# Patient Record
Sex: FEMALE | Race: WHITE | NOT HISPANIC OR LATINO
[De-identification: names, ages, dates, MRNs, and addresses within clinical notes are randomized per-mention and may not be internally consistent; named-entity substitution may affect disease eponyms.]

---

## 2023-04-14 PROBLEM — Z00.00 ENCOUNTER FOR PREVENTIVE HEALTH EXAMINATION: Status: ACTIVE | Noted: 2023-04-14

## 2023-04-17 ENCOUNTER — APPOINTMENT (OUTPATIENT)
Dept: ORTHOPEDIC SURGERY | Facility: CLINIC | Age: 53
End: 2023-04-17

## 2023-05-14 NOTE — PHYSICAL EXAM
[de-identified] : General: Well-nourished, well-developed, alert, and in no acute distress.\par Head: Normocephalic.\par Eyes: Pupils equal, extraocular muscles intact, normal sclera.\par Nose: No nasal discharge.\par Cardiovascular: Extremities are warm and well perfused. Distal pulses are symmetric bilaterally.\par Respiratory: No labored breathing.\par Extremities: Sensation is intact distally bilaterally. Distal pulses are symmetric bilaterally\par Lymphatic: No regional lymphadenopathy, no lymphedema\par Neurologic: No focal deficits\par Skin: Normal skin color, texture, and turgor\par Psychiatric: Normal affect \par \par MSK:\par Examination of   knee:\par \par Gait normal antalgic\par Genu   alignment\par Pain with double leg squat\par Valgus moment with single leg squat \par Moderate effusion\par No erythema, hematoma or skin lesion\par Tender to palpation:\par Nontender to palpation: medial joint line, lateral joint line, medial patellar facet, lateral patellar facet, quad tendon, patellar tendon, pes, Gerdy's tubercle, tibial tuberosity, popliteal fossa, hamstrings, ITB \par No warmth\par No Baker's cyst palpable\par ROM: 0-120\par Mild patellar crepitus\par \par Log roll negative\par Lachman negative\par Anterior drawer negative\par Posterior drawer negative\par Varus/valgus stress negative at 0 and 30 deg\par Javon  negative\par Patellar grind negative\par Noble negative\par Ge negative\par Thessaly negative\par \par \par Examination of  knee:\par \par No effusion, erythema, hematoma or skin lesion\par Nontender to palpation: medial joint line, lateral joint line, medial patellar facet, lateral patellar facet, quad tendon, patellar tendon, pes, Gerdy's tubercle, tibial tuberosity, popliteal fossa, hamstrings, ITB \par No warmth\par No Baker's cyst palpable\par ROM: 0-120\par No patellar crepitus\par \par Log roll negative\par Lachman negative\par Anterior drawer negative\par Posterior drawer negative\par Varus/valgus stress negative at 0 and 30 deg\par Javon  negative\par Patellar grind negative \par Noble negative\par Ge negative\par Thessaly negative\par \par Sensation is intact to light touch over the superficial and deep peroneal nerve distributions and the posterior tibial nerve distribution. Capillary refill is less than two seconds. Posterior tibial and dorsalis pedis pulses 2+ equal bilaterally. No calf swelling or tenderness bilaterally. Strength testing shows  Hip flexion 5/5, Hip abduction 5/5, Hip abduction 5/5, Knee Extension 5/5, Knee Flexion 5/5, dorsiflexion 5/5, plantar flexion 5/5, EHL 5/5\par Reflexes: Patellar 2+, Achilles 2+\par

## 2023-05-14 NOTE — HISTORY OF PRESENT ILLNESS
[de-identified] : GILLES CARTER is a 53 year old female who presents with  knee pain.\par States the onset of pain was \par No antecedent trauma or injury. Has not experienced previously.\par Pain is described   in quality.\par  /10 in intensity \par Pain is nonradiating.\par There is associated \par There is no associated swelling, stiffness, numbness, paraesthesia or weakness.\par Exacerbating factors are standing, walking for prolonged periods, climbing stairs, descending stairs, rising from seated position.\par Has tried acetaminophen, NSAIDS, ice, heat, rest with   effect\par Patient ambulates independently.\par Exercises regularly. \par Has not tried PT\par Employment:\par Lives

## 2023-05-14 NOTE — ASSESSMENT
[FreeTextEntry1] : GILLES CARTER is a 53 year old female with    knee pain.\par I discussed with the patient that their symptoms, signs, and imaging are most consistent with ***. \par We reviewed the natural history of this condition and treatment options ranging from conservative measures (activity modification, physical therapy, icing, oral anti-inflammatory and/or analgesic medications, steroid injection, HA gel injections, PRP injections) to surgical management. \par We agreed on the following plan:\par \par \par Activity modification: low impact aerobic activity (stationary bike, elliptical, swimming)\par Recommend 150 min per week of moderate intensity aerobic activity \par Start Home Exercises for knee conditioning. Demonstration, resistance bands and handout provided. \par Physical therapy. Referral provided.\par Medication:    prescription provided.\par Advanced imaging: consider MRI if no symptomatic improvement \par Follow up in 6-8 weeks.

## 2023-05-15 ENCOUNTER — APPOINTMENT (OUTPATIENT)
Dept: ORTHOPEDIC SURGERY | Facility: CLINIC | Age: 53
End: 2023-05-15

## 2023-08-22 ENCOUNTER — EMERGENCY (EMERGENCY)
Facility: HOSPITAL | Age: 53
LOS: 1 days | Discharge: LEFT BEFORE DISCHARGE | End: 2023-08-22
Admitting: EMERGENCY MEDICINE
Payer: MEDICAID

## 2023-08-22 VITALS
SYSTOLIC BLOOD PRESSURE: 118 MMHG | RESPIRATION RATE: 18 BRPM | OXYGEN SATURATION: 98 % | DIASTOLIC BLOOD PRESSURE: 65 MMHG | TEMPERATURE: 98 F | HEART RATE: 75 BPM

## 2023-08-22 VITALS
TEMPERATURE: 98 F | WEIGHT: 132.94 LBS | SYSTOLIC BLOOD PRESSURE: 119 MMHG | HEART RATE: 97 BPM | HEIGHT: 65 IN | OXYGEN SATURATION: 97 % | DIASTOLIC BLOOD PRESSURE: 68 MMHG | RESPIRATION RATE: 18 BRPM

## 2023-08-22 DIAGNOSIS — R07.0 PAIN IN THROAT: ICD-10-CM

## 2023-08-22 DIAGNOSIS — R53.83 OTHER FATIGUE: ICD-10-CM

## 2023-08-22 DIAGNOSIS — T58.14XA: ICD-10-CM

## 2023-08-22 DIAGNOSIS — Z53.29 PROCEDURE AND TREATMENT NOT CARRIED OUT BECAUSE OF PATIENT'S DECISION FOR OTHER REASONS: ICD-10-CM

## 2023-08-22 PROCEDURE — 99282 EMERGENCY DEPT VISIT SF MDM: CPT

## 2023-08-22 PROCEDURE — 99283 EMERGENCY DEPT VISIT LOW MDM: CPT

## 2023-08-22 NOTE — ED ADULT NURSE NOTE - NSFALLUNIVINTERV_ED_ALL_ED
Bed/Stretcher in lowest position, wheels locked, appropriate side rails in place/Call bell, personal items and telephone in reach/Instruct patient to call for assistance before getting out of bed/chair/stretcher/Non-slip footwear applied when patient is off stretcher/[place] to call system/Physically safe environment - no spills, clutter or unnecessary equipment/Purposeful proactive rounding/Room/bathroom lighting operational, light cord in reach

## 2023-08-22 NOTE — ED PROVIDER NOTE - OBJECTIVE STATEMENT
53 f without significant hx states she called [facility] due to expensive bill; she wanted her home checked to see if the meter was overcharging her, and she was told there is a gas leak with an elevated CO level (states 300 ppm).  The gas was disconnected, she opened the windows.  She reports chronic mild fatigue and throat irritation for over a year.  No headache, no vomiting.  States sometimes sleeps 12 hours and doesn't know why.    PMHx denies   Meds denies

## 2023-08-22 NOTE — ED ADULT TRIAGE NOTE - CHIEF COMPLAINT QUOTE
Pt states "con darryn told me I have a gas leak. I don't know how long it has been. I think it has been years." Pt endorses some fatigue.

## 2023-08-22 NOTE — ED PROVIDER NOTE - CLINICAL SUMMARY MEDICAL DECISION MAKING FREE TEXT BOX
Pt presented due to concern for elevated CO levels in her apartment.  Has no s/s of acute CO poisoning. She does not want blood work, but is requesting oxygen therapy, stating "even if it is for just a placebo effect, I think it would help me".  Her gas was disconnected after the leak was detected, and she aired out her apartment.

## 2023-08-22 NOTE — ED PROVIDER NOTE - NSFOLLOWUPINSTRUCTIONS_ED_ALL_ED_FT
Please follow up with your primary care provider.  Return to the Emergency Department if you have any new or worsening symptoms, or if you have any concerns.  ===============  Carbon Monoxide Poisoning    WHAT YOU NEED TO KNOW:    What is carbon monoxide (CO) poisoning? CO poisoning is a life-threatening condition caused by exposure to high levels of CO. CO is a poisonous gas that you cannot see, taste, or smell. Exposure happens when you breathe in CO. CO can build up in your body and replace oxygen in your blood. Your brain, organs, and tissues can be damaged from a lack of oxygen. CO poisoning can be mild or severe. Severe poisoning can cause permanent injury.    Where is CO found?    Smoke from a fire    Faulty devices or equipment, such as a furnace, water heater, gas stove, or wood-burning stove or fireplace    Gas-powered tools, vehicles, or machines used in poorly ventilated areas, such as a barbecue grill or chain saw    Nonvented devices such as propane heaters, stoves, grills, or lanterns used inside a house, trailer, or tent    Exhaust from cars or other vehicles  What increases my risk for CO poisoning?    Older age    Heart disease, blood vessel disease, sickle cell anemia, and lung problems    Pregnancy    Smoking cigarettes    Work that uses equipment or chemicals that produce CO  What are the signs and symptoms of CO poisoning? Signs and symptoms may develop right after CO exposure, or several weeks later. You may have any of the following:    Blurred vision, dizziness, or a headache    Nausea, vomiting, or loss of appetite    Faster breathing than normal, or trouble breathing    Weakness, muscle pain, or dark urine    Chest pain, or a fast, strong, or irregular heartbeat    Confusion, fainting, or seizures    Tremors or shaking, or trouble moving, bending arms or legs, or walking    Difficulty speaking, chewing, or controlling facial muscles  How is CO poisoning diagnosed? Your healthcare provider will examine you and ask about your symptoms. Tell him or her if anyone in your home has similar signs and symptoms. Pets may also show similar signs. Tell him or her if you use home heating devices that burn gas, oil, wood, or other fuel. You may need blood tests to check the level of CO in your blood. Blood tests may also show problems caused by CO poisoning. Your breath may be tested for the amount of CO it contains. Your heart rhythm and brain function may also be monitored.    How is CO poisoning treated?    Extra oxygen may be given if your blood oxygen level is lower than it should be. You may get oxygen through a mask placed over your nose and mouth or through small tubes placed in your nostrils.    Hyperbaric oxygen therapy is used to get more oxygen into your body. The oxygen is given under pressure to help it get into your tissues and blood. You will be in a room called a hyperbaric chamber during the treatment.  What should I do if I think I or someone else was exposed to CO? CO poisoning can seem like the flu. Anyone who may have been exposed to CO needs to be checked by a healthcare provider. The following are steps to take if you believe you or someone else is near a source of CO:    Move into fresh air. If safely possible, shut off the source of the CO. Wait for a professional to help you if you cannot do this safely.    Call 911. Explain when the exposure happened and how long you think it lasted.    Start CPR if needed and you are trained on how to do this. CPR may be needed if the person is not breathing.  What can I do to prevent CO poisoning?    Install a CO detector in every sleeping area in your home. Place it 5 feet above the floor and away from fireplaces or gas-burning equipment. Change the batteries twice each year.    Check your chimney, furnace, or wood stoves. Check for problems every year before you use them. Have your fireplace flue cleaned on a regular basis.    Be careful with gas appliances. Do not use barbecues or heaters that burn fuel inside your home or other closed spaces. Do not use your gas kitchen oven to heat your home. Make sure appliances are properly hooded or vented.    Do not let motor vehicles run in closed areas. This includes letting your car run in a garage. If the car is outside, check that the exhaust pipe is not blocked.    Do not smoke. Cigarette smoke contains small amounts of CO. This increases your risk of CO poisoning if you are exposed to a source of CO. Ask your healthcare provider for information if you need help quitting.  Call your local emergency number (911 in the ) if:    You have chest pain or an irregular or fast heartbeat.    You or someone close to you has a seizure or is unconscious.    You have trouble breathing or are breathing faster than usual.    You feel like you are going to faint.    You feel weak, have trouble moving, or have severe muscle pain.    Your urine becomes dark or red.  When should I call my doctor?    You feel dizzy.    You have a headache or start to vomit.    Your eyesight becomes blurred.    You have questions or concerns about your condition or care.  CARE AGREEMENT:    You have the right to help plan your care. Learn about your health condition and how it may be treated. Discuss treatment options with your healthcare providers to decide what care you want to receive. You always have the right to refuse treatment.

## 2023-08-22 NOTE — ED ADULT NURSE NOTE - OBJECTIVE STATEMENT
Pt presents to ED c/o Pt presents to ED c/o "exposure to a gas leak for over a year". Pt reports she was alerted by [facility] that she was exposed to a gas leak in her apartment, is unsure when it started. Denies SOB, cough, CP, dizziness, n/v/d, fevers, chills, abd pain. VSS. A&ox4.

## 2023-08-22 NOTE — ED PROVIDER NOTE - PHYSICAL EXAMINATION
CONSTITUTIONAL: NAD   SKIN: Normal color and turgor.    HEAD: NC/AT.  EYES: Conjunctiva clear. EOMI. PERRL.    ENT: Airway clear. Normal voice. MMM, normal color.  RESPIRATORY:  Normal work of breathing. Lungs CTAB.  CARDIOVASCULAR:  RRR, S1S2. No M/R/G.      GI:  Abdomen soft, nontender.    MSK: Neck supple.  No LE edema or calf tenderness. No joint swelling or ROM limitation.  NEURO: Alert; CN: grossly intact. Speech clear.  BENITEZ. Gait steady.

## 2023-12-08 ENCOUNTER — OUTPATIENT (OUTPATIENT)
Dept: OUTPATIENT SERVICES | Facility: HOSPITAL | Age: 53
LOS: 1 days | End: 2023-12-08
Payer: MEDICAID

## 2023-12-08 ENCOUNTER — APPOINTMENT (OUTPATIENT)
Dept: ORTHOPEDIC SURGERY | Facility: CLINIC | Age: 53
End: 2023-12-08
Payer: MEDICAID

## 2023-12-08 ENCOUNTER — RESULT REVIEW (OUTPATIENT)
Age: 53
End: 2023-12-08

## 2023-12-08 VITALS
WEIGHT: 132 LBS | SYSTOLIC BLOOD PRESSURE: 111 MMHG | BODY MASS INDEX: 21.99 KG/M2 | HEART RATE: 72 BPM | HEIGHT: 65 IN | OXYGEN SATURATION: 97 % | DIASTOLIC BLOOD PRESSURE: 73 MMHG | TEMPERATURE: 97.9 F

## 2023-12-08 DIAGNOSIS — Z78.9 OTHER SPECIFIED HEALTH STATUS: ICD-10-CM

## 2023-12-08 DIAGNOSIS — Z87.39 PERSONAL HISTORY OF OTHER DISEASES OF THE MUSCULOSKELETAL SYSTEM AND CONNECTIVE TISSUE: ICD-10-CM

## 2023-12-08 DIAGNOSIS — Z60.2 PROBLEMS RELATED TO LIVING ALONE: ICD-10-CM

## 2023-12-08 DIAGNOSIS — Z56.0 UNEMPLOYMENT, UNSPECIFIED: ICD-10-CM

## 2023-12-08 PROCEDURE — 73564 X-RAY EXAM KNEE 4 OR MORE: CPT

## 2023-12-08 PROCEDURE — 73564 X-RAY EXAM KNEE 4 OR MORE: CPT | Mod: 50

## 2023-12-08 PROCEDURE — 73564 X-RAY EXAM KNEE 4 OR MORE: CPT | Mod: 26,50

## 2023-12-08 PROCEDURE — 99203 OFFICE O/P NEW LOW 30 MIN: CPT

## 2023-12-08 RX ORDER — SERTRALINE HYDROCHLORIDE 50 MG/1
50 TABLET, FILM COATED ORAL
Refills: 0 | Status: ACTIVE | COMMUNITY

## 2023-12-08 RX ORDER — BUPROPION HYDROCHLORIDE 100 MG/1
TABLET, FILM COATED ORAL
Refills: 0 | Status: ACTIVE | COMMUNITY

## 2023-12-08 SDOH — ECONOMIC STABILITY - INCOME SECURITY: UNEMPLOYMENT, UNSPECIFIED: Z56.0

## 2023-12-08 SDOH — SOCIAL STABILITY - SOCIAL INSECURITY: PROBLEMS RELATED TO LIVING ALONE: Z60.2

## 2023-12-12 RX ORDER — HYALURONATE SODIUM 20 MG/2 ML
20 SYRINGE (ML) INTRAARTICULAR
Qty: 2 | Refills: 0 | Status: ACTIVE | COMMUNITY
Start: 2023-12-12

## 2024-02-08 ENCOUNTER — APPOINTMENT (OUTPATIENT)
Dept: ORTHOPEDIC SURGERY | Facility: CLINIC | Age: 54
End: 2024-02-08
Payer: MEDICAID

## 2024-02-08 VITALS
DIASTOLIC BLOOD PRESSURE: 55 MMHG | WEIGHT: 132 LBS | HEART RATE: 72 BPM | HEIGHT: 65 IN | OXYGEN SATURATION: 85 % | BODY MASS INDEX: 21.99 KG/M2 | SYSTOLIC BLOOD PRESSURE: 115 MMHG

## 2024-02-08 PROCEDURE — 20611 DRAIN/INJ JOINT/BURSA W/US: CPT | Mod: RT

## 2024-02-10 NOTE — PROCEDURE
[de-identified] : GILLES CARTER is a 54 year old female with bilateral knee OA who presents for viscosupplementation injection of bilateral knees. #1/3  No recent infection, fever or chills.  Ultrasound-guided intra-articular viscosupplementation injection of right knee:   Following a discussion of the risks (bleeding, infection) and benefits, verbal consent was obtained. Patient placed in supine position. The suprapatellar recess and surrounding structures (patella, femur, quadriceps tendon, suprapatellar fat pad and prefemoral fat pad) were visualized in SAX and LAX with Sonosite 15 Hz linear transducer.   Superolateral knee was anaesthetised with ethyl chloride spray. Under strict sterile technique the right knee was prepped with chlorhexadine. Using ultrasound guidance (superolateral approach) a 20 G 1.5 inch needle was inserted into the suprapatellar recess and 2 mL of Euflexxa HA gel was injected intra-articularly without resistance.   The use of direct ultrasound visualization was necessary to increase patient safety by identifying and avoiding inadvertent needle placement within the neurovascular and osteochondral structures. Additionally, the increased accuracy of needle placement may improve therapeutic efficacy and allow higher diagnostic specificity when evaluating the effectiveness of this injection.    Ultrasound-guided intra-articular viscosupplementation injection of left knee:    The suprapatellar recess and surrounding structures (patella, femur, quadriceps tendon, suprapatellar fat pad and prefemoral fat pad) were visualized in SAX and LAX with Sonosite 15 Hz linear transducer.   Superolateral knee was anaesthetised with ethyl chloride spray. Under strict sterile technique the right knee was prepped with chlorhexadine. Using ultrasound guidance (superolateral approach) a 20 G 1.5 inch needle was inserted into the suprapatellar recess and 2 mL of Euflexxa HA gel was injected intra-articularly without resistance.   The use of direct ultrasound visualization was necessary to increase patient safety by identifying and avoiding inadvertent needle placement within the neurovascular and osteochondral structures. Additionally, the increased accuracy of needle placement may improve therapeutic efficacy and allow higher diagnostic specificity when evaluating the effectiveness of this injection.   The patient tolerated the procedures well. Post-injection instructions given (no strenuous activity for 48 hours, ice, elevate). Patient verbalized understanding. Follow up in 1 week for #2/3 HA gel injection.  Euflexxa - B/L knee Lot- [id number] exp- 2024-12-09 [facility]- [address]-4.

## 2024-02-10 NOTE — HISTORY OF PRESENT ILLNESS
[de-identified] : GILLES CARTER is a 54 year old female presents to follow up with bilateral knee pain. Last visit was 12/08/23 at which time patient was advised to start home exercises and PT. States pain has

## 2024-02-10 NOTE — PROCEDURE
[de-identified] : GILLES CARTER is a 54 year old female with bilateral knee OA who presents for viscosupplementation injection of bilateral knees. #1/3  No recent infection, fever or chills.  Ultrasound-guided intra-articular viscosupplementation injection of right knee:   Following a discussion of the risks (bleeding, infection) and benefits, verbal consent was obtained. Patient placed in supine position. The suprapatellar recess and surrounding structures (patella, femur, quadriceps tendon, suprapatellar fat pad and prefemoral fat pad) were visualized in SAX and LAX with Sonosite 15 Hz linear transducer.   Superolateral knee was anaesthetised with ethyl chloride spray. Under strict sterile technique the right knee was prepped with chlorhexadine. Using ultrasound guidance (superolateral approach) a 20 G 1.5 inch needle was inserted into the suprapatellar recess and 2 mL of Euflexxa HA gel was injected intra-articularly without resistance.   The use of direct ultrasound visualization was necessary to increase patient safety by identifying and avoiding inadvertent needle placement within the neurovascular and osteochondral structures. Additionally, the increased accuracy of needle placement may improve therapeutic efficacy and allow higher diagnostic specificity when evaluating the effectiveness of this injection.    Ultrasound-guided intra-articular viscosupplementation injection of left knee:    The suprapatellar recess and surrounding structures (patella, femur, quadriceps tendon, suprapatellar fat pad and prefemoral fat pad) were visualized in SAX and LAX with Sonosite 15 Hz linear transducer.   Superolateral knee was anaesthetised with ethyl chloride spray. Under strict sterile technique the right knee was prepped with chlorhexadine. Using ultrasound guidance (superolateral approach) a 20 G 1.5 inch needle was inserted into the suprapatellar recess and 2 mL of Euflexxa HA gel was injected intra-articularly without resistance.   The use of direct ultrasound visualization was necessary to increase patient safety by identifying and avoiding inadvertent needle placement within the neurovascular and osteochondral structures. Additionally, the increased accuracy of needle placement may improve therapeutic efficacy and allow higher diagnostic specificity when evaluating the effectiveness of this injection.   The patient tolerated the procedures well. Post-injection instructions given (no strenuous activity for 48 hours, ice, elevate). Patient verbalized understanding. Follow up in 1 week for #2/3 HA gel injection.  Euflexxa - B/L knee Lot- [id number] exp- 2024-12-09 [facility]- [id number].

## 2024-02-10 NOTE — ASSESSMENT
[FreeTextEntry1] : GILLES CARTER is a 54 year old female with bilateral knee pain. I discussed with the patient that their symptoms, signs, and imaging are most consistent with  **.  We reviewed the natural history of this condition and treatment options. We agreed on the following plan:  Encouraged to continue home exercises per handout. Continue physical therapy. Recommend 150 min per week of moderate intensity aerobic activity  Medication:    prescription provided. Imaging: Follow up in 6-8 weeks.

## 2024-02-10 NOTE — HISTORY OF PRESENT ILLNESS
[de-identified] : GILLES CARTER is a 54 year old female presents to follow up with bilateral knee pain. Last visit was 12/08/23 at which time patient was advised to start home exercises and PT. States pain has

## 2024-02-15 ENCOUNTER — APPOINTMENT (OUTPATIENT)
Dept: ORTHOPEDIC SURGERY | Facility: CLINIC | Age: 54
End: 2024-02-15
Payer: MEDICAID

## 2024-02-15 VITALS
OXYGEN SATURATION: 99 % | DIASTOLIC BLOOD PRESSURE: 63 MMHG | BODY MASS INDEX: 21.99 KG/M2 | WEIGHT: 132 LBS | HEART RATE: 80 BPM | HEIGHT: 65 IN | SYSTOLIC BLOOD PRESSURE: 100 MMHG

## 2024-02-15 PROCEDURE — 20611 DRAIN/INJ JOINT/BURSA W/US: CPT | Mod: 50

## 2024-02-17 NOTE — PROCEDURE
[de-identified] : GILLES CARTER is a 54 year old female with bilateral knee OA who presents for viscosupplementation injection of bilateral knees. #2/3 No recent infection, fever or chills.  Ultrasound-guided intra-articular viscosupplementation injection of right knee:  Following a discussion of the risks (bleeding, infection) and benefits, verbal consent was obtained. Patient placed in supine position. The suprapatellar recess and surrounding structures (patella, femur, quadriceps tendon, suprapatellar fat pad and prefemoral fat pad) were visualized in SAX and LAX with Sonosite 15 Hz linear transducer.  Superolateral knee was anaesthetised with ethyl chloride spray. Under strict sterile technique the right knee was prepped with chlorhexadine. Using ultrasound guidance (superolateral approach) a 20 G 1.5 inch needle was inserted into the suprapatellar recess and 2 mL of Euflexxa HA gel was injected intra-articularly without resistance.  The use of direct ultrasound visualization was necessary to increase patient safety by identifying and avoiding inadvertent needle placement within the neurovascular and osteochondral structures. Additionally, the increased accuracy of needle placement may improve therapeutic efficacy and allow higher diagnostic specificity when evaluating the effectiveness of this injection.   Ultrasound-guided intra-articular viscosupplementation injection of left knee:   The suprapatellar recess and surrounding structures (patella, femur, quadriceps tendon, suprapatellar fat pad and prefemoral fat pad) were visualized in SAX and LAX with Sonosite 15 Hz linear transducer.  Superolateral knee was anaesthetised with ethyl chloride spray. Under strict sterile technique the right knee was prepped with chlorhexadine. Using ultrasound guidance (superolateral approach) a 20 G 1.5 inch needle was inserted into the suprapatellar recess and 2 mL of Euflexxa HA gel was injected intra-articularly without resistance.  The use of direct ultrasound visualization was necessary to increase patient safety by identifying and avoiding inadvertent needle placement within the neurovascular and osteochondral structures. Additionally, the increased accuracy of needle placement may improve therapeutic efficacy and allow higher diagnostic specificity when evaluating the effectiveness of this injection.  The patient tolerated the procedures well. Post-injection instructions given (no strenuous activity for 48 hours, ice, elevate). Patient verbalized understanding. Follow up in 1 week for #3/3 HA gel injection.  EUFFLEXA BILATERAL KNEE JOINT  LOT:   [id number] EXP:  2024-12-09 MAN: [employer] NDC: [address]-7

## 2024-02-20 ENCOUNTER — APPOINTMENT (OUTPATIENT)
Dept: ORTHOPEDIC SURGERY | Facility: CLINIC | Age: 54
End: 2024-02-20
Payer: MEDICAID

## 2024-02-20 PROCEDURE — 20611 DRAIN/INJ JOINT/BURSA W/US: CPT | Mod: 50

## 2024-02-20 NOTE — PROCEDURE
[de-identified] : GILLES CARTER is a 54 year old female with bilateral knee OA who presents for viscosupplementation injection of bilateral knees. #3/3 No recent infection, fever or chills.  Ultrasound-guided intra-articular viscosupplementation injection of right knee:  Following a discussion of the risks (bleeding, infection) and benefits, verbal consent was obtained. Patient placed in supine position. The suprapatellar recess and surrounding structures (patella, femur, quadriceps tendon, suprapatellar fat pad and prefemoral fat pad) were visualized in SAX and LAX with Sonosite 15 Hz linear transducer.  Superolateral knee was anaesthetised with ethyl chloride spray. Under strict sterile technique the right knee was prepped with chlorhexadine. Using ultrasound guidance (superolateral approach) a 20 G 1.5 inch needle was inserted into the suprapatellar recess and 2 mL of Euflexxa HA gel was injected intra-articularly without resistance.  The use of direct ultrasound visualization was necessary to increase patient safety by identifying and avoiding inadvertent needle placement within the neurovascular and osteochondral structures. Additionally, the increased accuracy of needle placement may improve therapeutic efficacy and allow higher diagnostic specificity when evaluating the effectiveness of this injection.   Ultrasound-guided intra-articular viscosupplementation injection of left knee:   The suprapatellar recess and surrounding structures (patella, femur, quadriceps tendon, suprapatellar fat pad and prefemoral fat pad) were visualized in SAX and LAX with Sonosite 15 Hz linear transducer.  Superolateral knee was anaesthetised with ethyl chloride spray. Under strict sterile technique the right knee was prepped with chlorhexadine. Using ultrasound guidance (superolateral approach) a 20 G 1.5 inch needle was inserted into the suprapatellar recess and 2 mL of Euflexxa HA gel was injected intra-articularly without resistance.  The use of direct ultrasound visualization was necessary to increase patient safety by identifying and avoiding inadvertent needle placement within the neurovascular and osteochondral structures. Additionally, the increased accuracy of needle placement may improve therapeutic efficacy and allow higher diagnostic specificity when evaluating the effectiveness of this injection.  The patient tolerated the procedures well. Post-injection instructions given (no strenuous activity for 48 hours, ice, elevate). Patient verbalized understanding. Continue home exercises. Follow up in 4 months.  EUFLEXXA BILATERAL KNEE JOINT  LOT:   [id number] EXP:  2024-05-21 MAN: [email or web address]  NDC: [address]-2

## 2024-06-20 ENCOUNTER — APPOINTMENT (OUTPATIENT)
Dept: ORTHOPEDIC SURGERY | Facility: CLINIC | Age: 54
End: 2024-06-20
Payer: MEDICAID

## 2024-06-20 VITALS
SYSTOLIC BLOOD PRESSURE: 93 MMHG | OXYGEN SATURATION: 96 % | BODY MASS INDEX: 21.99 KG/M2 | WEIGHT: 132 LBS | HEIGHT: 65 IN | DIASTOLIC BLOOD PRESSURE: 58 MMHG | HEART RATE: 84 BPM

## 2024-06-20 DIAGNOSIS — M17.0 BILATERAL PRIMARY OSTEOARTHRITIS OF KNEE: ICD-10-CM

## 2024-06-20 PROCEDURE — 99213 OFFICE O/P EST LOW 20 MIN: CPT

## 2024-06-20 RX ORDER — CYCLOBENZAPRINE HYDROCHLORIDE 10 MG/1
10 TABLET, FILM COATED ORAL 3 TIMES DAILY
Qty: 30 | Refills: 2 | Status: ACTIVE | COMMUNITY
Start: 2024-06-20 | End: 1900-01-01

## 2024-06-20 RX ORDER — HYALURONATE SODIUM 20 MG/2 ML
20 SYRINGE (ML) INTRAARTICULAR
Qty: 2 | Refills: 0 | Status: ACTIVE | COMMUNITY
Start: 2024-06-20

## 2024-06-23 PROBLEM — M17.0 PRIMARY OSTEOARTHRITIS OF BOTH KNEES: Status: ACTIVE | Noted: 2023-12-10

## 2024-06-23 NOTE — ASSESSMENT
[FreeTextEntry1] : GILLES CARTER is a 54 year old female with bilateral knee pain.   I discussed with the patient that their symptoms, signs, and imaging are most consistent with osteoarthritis. We reviewed the natural history of this condition and treatment options. We agreed on the following plan:  Encouraged to continue home exercises per handout. Medication: Cyclobenzaprine 10mg HS prn  prescription provided. Will place order for HA gel injection for on or after 8/8/24 Patient will be notified once authorized to schedule appointment.

## 2024-06-23 NOTE — HISTORY OF PRESENT ILLNESS
[de-identified] : GILLES CARTER is a 54 year old female presents to follow up bilateral knee pain. Last visit was 02/05/4 at which time patient had receive a viscosupplementation injections.  Has been pain-free following injections. She had been taking Cyclobenzaprine occasionally at bedtime for knee pain. Her current prescription is 10 years old. Would like repeat prescription today.

## 2024-06-23 NOTE — PHYSICAL EXAM
[de-identified] : General: Well-nourished, well-developed, alert, and in no acute distress. Head: Normocephalic. Eyes: Pupils equal, extraocular muscles intact, normal sclera. Nose: No nasal discharge. Cardiovascular: Extremities are warm and well perfused. Distal pulses are symmetric bilaterally. Respiratory: No labored breathing. Extremities: Sensation is intact distally bilaterally. Distal pulses are symmetric bilaterally Lymphatic: No regional lymphadenopathy, no lymphedema Neurologic: No focal deficits Skin: Normal skin color, texture, and turgor Psychiatric: Normal affect  MSK: Examination of [right] knee:   [No] effusion No erythema, hematoma or skin lesion Nontender to palpation: medial joint line, lateral joint line, medial patellar facet, lateral patellar facet, quad tendon, patellar tendon, pes, Gerdy's tubercle, tibial tuberosity, popliteal fossa, hamstrings, ITB No warmth No Baker's cyst palpable ROM: 0-130 Mild patellar crepitus   Log roll negative Lachman negative Anterior drawer negative Posterior drawer negative Varus/valgus stress negative at 0 and 30 deg Javon negative Patellar grind negative   Examination of [left] knee:   No effusion, erythema, hematoma or skin lesion Nontender to palpation: medial joint line, lateral joint line, medial patellar facet, lateral patellar facet, quad tendon, patellar tendon, pes, Gerdy's tubercle, tibial tuberosity, popliteal fossa, hamstrings, ITB No warmth No Baker's cyst palpable ROM: 0-130 Mild patellar crepitus   Log roll negative Lachman negative Anterior drawer negative Posterior drawer negative Varus/valgus stress negative at 0 and 30 deg Javon negative Patellar grind negative   Sensation is intact to light touch over the superficial and deep peroneal nerve distributions and the posterior tibial nerve distribution. Capillary refill is less than two seconds. Posterior tibial and dorsalis pedis pulses 2+ equal bilaterally. No calf swelling or tenderness bilaterally. Strength testing shows Hip flexion 5/5, Hip adduction 5/5, Hip abduction 5/5, Knee Extension 5/5, Knee Flexion 5/5, dorsiflexion 5/5, plantar flexion 5/5, EHL 5/5 Reflexes: Patellar 2+, Achilles 2+